# Patient Record
Sex: FEMALE | Race: BLACK OR AFRICAN AMERICAN | NOT HISPANIC OR LATINO | URBAN - METROPOLITAN AREA
[De-identification: names, ages, dates, MRNs, and addresses within clinical notes are randomized per-mention and may not be internally consistent; named-entity substitution may affect disease eponyms.]

---

## 2023-03-16 ENCOUNTER — TELEPHONE (OUTPATIENT)
Dept: PRIMARY CARE | Facility: CLINIC | Age: 33
End: 2023-03-16
Payer: MEDICAID

## 2023-03-16 DIAGNOSIS — Z01.84 IMMUNITY STATUS TESTING: Primary | ICD-10-CM

## 2023-03-16 NOTE — TELEPHONE ENCOUNTER
Patient called to ask for measles vaccination. She's not able to access it online. Would like to confirm if and when she received this. If not, then can she arrive tomorrow for one? Also, she's inuring about a Covid exemption paperwork and questions on it. One in particular asks when PCP discovered that she couldn't take vaccines for coronavirus?

## 2023-03-16 NOTE — TELEPHONE ENCOUNTER
Patient was called back she stated last year when she had blood work done the measles was not checked or resulted and she needs to have that order put in for her job.    In regards to the paperwork she was asking for too many changes and stated she was just going to schedule a visit to go over with pcp     Just need to add blood work order at this time

## 2023-03-17 ENCOUNTER — LAB (OUTPATIENT)
Dept: LAB | Facility: LAB | Age: 33
End: 2023-03-17
Payer: COMMERCIAL

## 2023-03-17 DIAGNOSIS — Z01.84 IMMUNITY STATUS TESTING: ICD-10-CM

## 2023-03-17 LAB — RUBEOLA IGG ANTIBODY: POSITIVE

## 2023-03-17 PROCEDURE — 36415 COLL VENOUS BLD VENIPUNCTURE: CPT

## 2023-03-17 PROCEDURE — 86765 RUBEOLA ANTIBODY: CPT

## 2023-03-22 PROBLEM — F41.0 GENERALIZED ANXIETY DISORDER WITH PANIC ATTACKS: Status: ACTIVE | Noted: 2023-03-22

## 2023-03-22 PROBLEM — F41.1 GENERALIZED ANXIETY DISORDER WITH PANIC ATTACKS: Status: ACTIVE | Noted: 2023-03-22

## 2023-03-22 PROBLEM — R29.898 HEAVY SENSATION OF LOWER EXTREMITY: Status: ACTIVE | Noted: 2023-03-22

## 2023-03-22 PROBLEM — J18.9 PNEUMONIA, PRIMARY ATYPICAL: Status: ACTIVE | Noted: 2023-03-22

## 2023-03-22 PROBLEM — J45.40 MODERATE PERSISTENT ASTHMA WITHOUT COMPLICATION (HHS-HCC): Status: ACTIVE | Noted: 2023-03-22

## 2023-03-22 PROBLEM — F51.04 CHRONIC INSOMNIA: Status: ACTIVE | Noted: 2023-03-22

## 2023-03-22 PROBLEM — M54.9 MULTILEVEL SPINE PAIN: Status: ACTIVE | Noted: 2023-03-22

## 2023-03-22 PROBLEM — B37.0 ORAL THRUSH: Status: ACTIVE | Noted: 2023-03-22

## 2023-03-22 PROBLEM — M79.2 NEUROPATHIC PAIN OF LOWER EXTREMITY: Status: ACTIVE | Noted: 2023-03-22

## 2023-03-22 PROBLEM — R53.81 MALAISE AND FATIGUE: Status: ACTIVE | Noted: 2023-03-22

## 2023-03-22 PROBLEM — M79.18 MYOFASCIAL PAIN DYSFUNCTION SYNDROME: Status: ACTIVE | Noted: 2023-03-22

## 2023-03-22 PROBLEM — G47.30 SLEEP APNEA: Status: ACTIVE | Noted: 2023-03-22

## 2023-03-22 PROBLEM — G89.29 CHRONIC LOW BACK PAIN: Status: ACTIVE | Noted: 2023-03-22

## 2023-03-22 PROBLEM — M25.561 RIGHT KNEE PAIN: Status: ACTIVE | Noted: 2023-03-22

## 2023-03-22 PROBLEM — M25.461 EFFUSION OF RIGHT KNEE: Status: ACTIVE | Noted: 2023-03-22

## 2023-03-22 PROBLEM — M54.16 LUMBAR RADICULOPATHY: Status: ACTIVE | Noted: 2023-03-22

## 2023-03-22 PROBLEM — M79.89 SWELLING OF BOTH LOWER EXTREMITIES: Status: ACTIVE | Noted: 2023-03-22

## 2023-03-22 PROBLEM — R20.2 NUMBNESS AND TINGLING OF BOTH LEGS: Status: ACTIVE | Noted: 2023-03-22

## 2023-03-22 PROBLEM — R20.0 NUMBNESS AND TINGLING OF BOTH LEGS: Status: ACTIVE | Noted: 2023-03-22

## 2023-03-22 PROBLEM — R53.83 MALAISE AND FATIGUE: Status: ACTIVE | Noted: 2023-03-22

## 2023-03-22 PROBLEM — M54.50 CHRONIC LOW BACK PAIN: Status: ACTIVE | Noted: 2023-03-22

## 2023-03-22 RX ORDER — ALBUTEROL SULFATE 90 UG/1
1 AEROSOL, METERED RESPIRATORY (INHALATION) EVERY 4 HOURS PRN
COMMUNITY
Start: 2020-04-09 | End: 2023-06-21 | Stop reason: SDUPTHER

## 2023-03-22 RX ORDER — ONDANSETRON 4 MG/1
4 TABLET, ORALLY DISINTEGRATING ORAL EVERY 8 HOURS PRN
COMMUNITY
Start: 2022-09-12 | End: 2023-10-13 | Stop reason: SDUPTHER

## 2023-03-22 RX ORDER — FLUTICASONE PROPIONATE AND SALMETEROL 50; 250 UG/1; UG/1
1 POWDER RESPIRATORY (INHALATION) EVERY 12 HOURS
COMMUNITY

## 2023-03-22 RX ORDER — ALBUTEROL SULFATE 0.83 MG/ML
2.5 SOLUTION RESPIRATORY (INHALATION) EVERY 6 HOURS PRN
COMMUNITY
Start: 2021-12-30 | End: 2023-10-13 | Stop reason: SDUPTHER

## 2023-03-22 RX ORDER — GABAPENTIN 400 MG/1
1 CAPSULE ORAL 3 TIMES DAILY
COMMUNITY
Start: 2018-10-24

## 2023-03-22 RX ORDER — PEN NEEDLE, DIABETIC 31 GX5/16"
NEEDLE, DISPOSABLE MISCELLANEOUS
COMMUNITY
Start: 2021-12-30

## 2023-03-22 RX ORDER — IBUPROFEN 600 MG/1
600 TABLET ORAL EVERY 6 HOURS PRN
COMMUNITY
Start: 2023-01-31

## 2023-03-22 RX ORDER — NORGESTIMATE AND ETHINYL ESTRADIOL 0.25-0.035
1 KIT ORAL DAILY
COMMUNITY
Start: 2022-08-30

## 2023-03-22 RX ORDER — TIRZEPATIDE 5 MG/.5ML
5 INJECTION, SOLUTION SUBCUTANEOUS
COMMUNITY
Start: 2023-02-23 | End: 2023-03-31 | Stop reason: SDUPTHER

## 2023-03-22 RX ORDER — TIRZEPATIDE 2.5 MG/.5ML
2.5 INJECTION, SOLUTION SUBCUTANEOUS
COMMUNITY
Start: 2022-12-14 | End: 2023-03-31 | Stop reason: DRUGHIGH

## 2023-03-22 RX ORDER — MELOXICAM 15 MG/1
TABLET ORAL
COMMUNITY
Start: 2022-08-29

## 2023-03-22 RX ORDER — BUDESONIDE AND FORMOTEROL FUMARATE DIHYDRATE 160; 4.5 UG/1; UG/1
2 AEROSOL RESPIRATORY (INHALATION)
COMMUNITY
Start: 2020-07-08

## 2023-03-31 ENCOUNTER — OFFICE VISIT (OUTPATIENT)
Dept: PRIMARY CARE | Facility: CLINIC | Age: 33
End: 2023-03-31
Payer: MEDICAID

## 2023-03-31 VITALS
OXYGEN SATURATION: 98 % | HEIGHT: 61 IN | BODY MASS INDEX: 29.83 KG/M2 | WEIGHT: 158 LBS | DIASTOLIC BLOOD PRESSURE: 75 MMHG | HEART RATE: 89 BPM | SYSTOLIC BLOOD PRESSURE: 107 MMHG

## 2023-03-31 DIAGNOSIS — E66.09 CLASS 1 OBESITY DUE TO EXCESS CALORIES WITHOUT SERIOUS COMORBIDITY IN ADULT, UNSPECIFIED BMI: ICD-10-CM

## 2023-03-31 DIAGNOSIS — J06.9 VIRAL UPPER RESPIRATORY TRACT INFECTION: Primary | ICD-10-CM

## 2023-03-31 PROCEDURE — 99213 OFFICE O/P EST LOW 20 MIN: CPT | Performed by: FAMILY MEDICINE

## 2023-03-31 PROCEDURE — 1036F TOBACCO NON-USER: CPT | Performed by: FAMILY MEDICINE

## 2023-03-31 RX ORDER — TIRZEPATIDE 5 MG/.5ML
5 INJECTION, SOLUTION SUBCUTANEOUS
Qty: 2 ML | Refills: 2 | Status: SHIPPED | OUTPATIENT
Start: 2023-03-31 | End: 2023-04-29 | Stop reason: DRUGHIGH

## 2023-03-31 ASSESSMENT — ENCOUNTER SYMPTOMS
COUGH: 0
FEVER: 0
CHILLS: 0
SORE THROAT: 1
MYALGIAS: 1
BACK PAIN: 1
FATIGUE: 0

## 2023-03-31 NOTE — PROGRESS NOTES
"Subjective   Patient ID: Leah Gonzáles is a 32 y.o. female who presents for Sore Throat.  HPI  Leah initially presented for paperwork completion, but has been ill for last several weeks, with sore throat,  congestion.  Review of Systems   Constitutional:  Negative for chills, fatigue and fever.   HENT:  Positive for congestion and sore throat.    Respiratory:  Negative for cough.    Cardiovascular:  Negative for chest pain.   Musculoskeletal:  Positive for back pain and myalgias (Lower extremity).       Objective   Vitals:    03/31/23 0832   BP: 107/75   BP Location: Left arm   Pulse: 89   SpO2: 98%   Weight: 71.7 kg (158 lb)   Height: 1.549 m (5' 1\")      Physical Exam  Constitutional:       General: She is not in acute distress.     Appearance: Normal appearance.   HENT:      Head: Normocephalic and atraumatic.      Right Ear: Tympanic membrane normal.      Left Ear: Tympanic membrane normal.      Ears:      Comments: Large buildup of soft brown cerumen bilaterally but not obstructing     Mouth/Throat:      Pharynx: Oropharynx is clear. No pharyngeal swelling or oropharyngeal exudate.   Eyes:      Extraocular Movements: Extraocular movements intact.      Pupils: Pupils are equal, round, and reactive to light.   Cardiovascular:      Rate and Rhythm: Normal rate and regular rhythm.   Pulmonary:      Effort: Pulmonary effort is normal.      Breath sounds: Normal breath sounds.   Abdominal:      General: There is no distension.      Tenderness: There is no abdominal tenderness.   Musculoskeletal:      Cervical back: Normal range of motion and neck supple.   Neurological:      General: No focal deficit present.      Mental Status: She is alert and oriented to person, place, and time.   Psychiatric:         Mood and Affect: Mood normal.         Behavior: Behavior normal.         Assessment/Plan   There are no diagnoses linked to this encounter.    Problem List Items Addressed This Visit    None  Visit Diagnoses  "      Viral upper respiratory tract infection    -  Primary    Discussed rest, supportive measures.  If not improved in next 3-5 days would consider treatment for sinusitis.

## 2023-04-06 ENCOUNTER — TELEPHONE (OUTPATIENT)
Dept: PRIMARY CARE | Facility: CLINIC | Age: 33
End: 2023-04-06
Payer: MEDICAID

## 2023-04-06 DIAGNOSIS — M79.2 NEUROPATHIC PAIN OF LOWER EXTREMITY, UNSPECIFIED LATERALITY: Primary | ICD-10-CM

## 2023-04-06 NOTE — TELEPHONE ENCOUNTER
I just left a vm w/pt to please call back w/ the specific name of the medication she needs a refill for.

## 2023-04-06 NOTE — TELEPHONE ENCOUNTER
Patient left voicemail on provider refill line for:    Name of medication & dose:  Acetaminophen for her leg going numb again  Quantity & refills requested: as per last time   Amount of medication remaining:  the VM didn't state  If out of medication, for how long?      Last office visit:  3/31/23  Last labs (if applicable):    Future appointment?  N/a    Pharmacy verified.  Aaliyah in chart  Allergies updated.       The patient was informed the requested medication request will be processed within 3 business days unless they are contacted by a staff member to advise otherwise.

## 2023-04-06 NOTE — TELEPHONE ENCOUNTER
Please call patient and verify I tried to call and no answer can try again later she does not have that medication listed in her chart

## 2023-04-07 NOTE — TELEPHONE ENCOUNTER
Pt said it's Tylenol 3. She last got it in February and he only gives it to her as needed, not a monthly refill.

## 2023-04-14 RX ORDER — ACETAMINOPHEN AND CODEINE PHOSPHATE 300; 30 MG/1; MG/1
1 TABLET ORAL EVERY 6 HOURS PRN
Qty: 24 TABLET | Refills: 0 | Status: SHIPPED | OUTPATIENT
Start: 2023-04-14 | End: 2023-04-20 | Stop reason: WASHOUT

## 2023-04-14 NOTE — TELEPHONE ENCOUNTER
Pt stopped up here inquiring about the status of the Tylenol 3. I explained the assistant is no longer here and the one taking over is trying to catch up, and she understood.

## 2023-04-24 ENCOUNTER — TELEPHONE (OUTPATIENT)
Dept: PRIMARY CARE | Facility: CLINIC | Age: 33
End: 2023-04-24
Payer: MEDICAID

## 2023-04-24 DIAGNOSIS — E66.09 CLASS 1 OBESITY DUE TO EXCESS CALORIES WITHOUT SERIOUS COMORBIDITY IN ADULT, UNSPECIFIED BMI: Primary | ICD-10-CM

## 2023-04-24 NOTE — TELEPHONE ENCOUNTER
Pt left a vm that during her last appt she stated she did not want to increase her Mounjaro, but she would like you to increase it due to her gaining weight. Pt call back # [phone]

## 2023-04-29 RX ORDER — TIRZEPATIDE 7.5 MG/.5ML
7.5 INJECTION, SOLUTION SUBCUTANEOUS
Qty: 2 ML | Refills: 2 | Status: SHIPPED | OUTPATIENT
Start: 2023-04-29 | End: 2023-06-21 | Stop reason: SDUPTHER

## 2023-05-01 ENCOUNTER — TELEPHONE (OUTPATIENT)
Dept: PRIMARY CARE | Facility: CLINIC | Age: 33
End: 2023-05-01
Payer: MEDICAID

## 2023-05-01 NOTE — TELEPHONE ENCOUNTER
Type of form: Prior - Mounjaro    Received via: fax    Received from: alvaro    Form placed: in your box

## 2023-05-04 ENCOUNTER — TELEPHONE (OUTPATIENT)
Dept: PRIMARY CARE | Facility: CLINIC | Age: 33
End: 2023-05-04
Payer: MEDICAID

## 2023-05-04 NOTE — TELEPHONE ENCOUNTER
Type of form: Prior - Mounjaro    Received via: fax    Received from: Aaliyah    Form placed: in your box

## 2023-05-15 ENCOUNTER — TELEPHONE (OUTPATIENT)
Dept: PRIMARY CARE | Facility: CLINIC | Age: 33
End: 2023-05-15
Payer: MEDICAID

## 2023-05-15 DIAGNOSIS — F41.1 GENERALIZED ANXIETY DISORDER WITH PANIC ATTACKS: Primary | ICD-10-CM

## 2023-05-15 DIAGNOSIS — F41.0 GENERALIZED ANXIETY DISORDER WITH PANIC ATTACKS: Primary | ICD-10-CM

## 2023-05-15 RX ORDER — LORAZEPAM 0.5 MG/1
0.5 TABLET ORAL 2 TIMES DAILY PRN
Qty: 30 TABLET | Refills: 0 | Status: SHIPPED | OUTPATIENT
Start: 2023-05-15 | End: 2023-06-30 | Stop reason: SDUPTHER

## 2023-05-15 NOTE — TELEPHONE ENCOUNTER
Pt walked into the office requesting medication for her anxiety. She said she has been calling. I said the messages were for weight loss. She wants medication for anxiety from 2019 ASAP.

## 2023-05-31 ENCOUNTER — TELEPHONE (OUTPATIENT)
Dept: PRIMARY CARE | Facility: CLINIC | Age: 33
End: 2023-05-31
Payer: MEDICAID

## 2023-05-31 NOTE — LETTER
June 1, 2023     Patient: Leah Gonzáles   YOB: 1990   Date of Visit: 5/31/2023       To Whom It May Concern:    Leah Gonzáles is under my care.  She suffers from claustrophobia.  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    If you have any questions or concerns, please don't hesitate to call.         Sincerely,         Nik Stanton MD        CC: No Recipients

## 2023-05-31 NOTE — TELEPHONE ENCOUNTER
Pt said she would need a letter stating she needs to move to a smaller classroom to a bigger classroom due to her claustrophobia  please.

## 2023-06-05 ENCOUNTER — TELEPHONE (OUTPATIENT)
Dept: PRIMARY CARE | Facility: CLINIC | Age: 33
End: 2023-06-05
Payer: MEDICAID

## 2023-06-05 NOTE — TELEPHONE ENCOUNTER
Received Medical Records Request from Santiago Ramon and sent to O. Received fax confirmation 6/1/23 @ 4:14pm

## 2023-06-21 ENCOUNTER — OFFICE VISIT (OUTPATIENT)
Dept: PRIMARY CARE | Facility: CLINIC | Age: 33
End: 2023-06-21
Payer: MEDICAID

## 2023-06-21 VITALS
SYSTOLIC BLOOD PRESSURE: 107 MMHG | HEART RATE: 94 BPM | BODY MASS INDEX: 30.99 KG/M2 | DIASTOLIC BLOOD PRESSURE: 74 MMHG | WEIGHT: 164 LBS

## 2023-06-21 DIAGNOSIS — H69.91 ETD (EUSTACHIAN TUBE DYSFUNCTION), RIGHT: ICD-10-CM

## 2023-06-21 DIAGNOSIS — M54.16 LUMBAR RADICULOPATHY: ICD-10-CM

## 2023-06-21 DIAGNOSIS — J45.40 MODERATE PERSISTENT ASTHMA WITHOUT COMPLICATION (HHS-HCC): ICD-10-CM

## 2023-06-21 DIAGNOSIS — E66.09 CLASS 1 OBESITY DUE TO EXCESS CALORIES WITHOUT SERIOUS COMORBIDITY WITH BODY MASS INDEX (BMI) OF 30.0 TO 30.9 IN ADULT: Primary | ICD-10-CM

## 2023-06-21 DIAGNOSIS — M79.2 NEUROPATHIC PAIN OF LOWER EXTREMITY, UNSPECIFIED LATERALITY: ICD-10-CM

## 2023-06-21 PROCEDURE — 3008F BODY MASS INDEX DOCD: CPT | Performed by: FAMILY MEDICINE

## 2023-06-21 PROCEDURE — 1036F TOBACCO NON-USER: CPT | Performed by: FAMILY MEDICINE

## 2023-06-21 PROCEDURE — 99214 OFFICE O/P EST MOD 30 MIN: CPT | Performed by: FAMILY MEDICINE

## 2023-06-21 RX ORDER — TIRZEPATIDE 7.5 MG/.5ML
7.5 INJECTION, SOLUTION SUBCUTANEOUS
Qty: 2 ML | Refills: 2 | Status: SHIPPED | OUTPATIENT
Start: 2023-06-21 | End: 2023-07-03

## 2023-06-21 RX ORDER — DIAZEPAM 5 MG/1
TABLET ORAL
COMMUNITY
Start: 2023-04-25

## 2023-06-21 RX ORDER — ALBUTEROL SULFATE 90 UG/1
1 AEROSOL, METERED RESPIRATORY (INHALATION) EVERY 4 HOURS PRN
Qty: 18 G | Refills: 1 | Status: SHIPPED | OUTPATIENT
Start: 2023-06-21 | End: 2023-10-13 | Stop reason: SDUPTHER

## 2023-06-21 RX ORDER — FLUTICASONE PROPIONATE 50 MCG
1 SPRAY, SUSPENSION (ML) NASAL DAILY
Qty: 16 G | Refills: 1 | Status: SHIPPED | OUTPATIENT
Start: 2023-06-21 | End: 2024-06-20

## 2023-06-21 RX ORDER — ACETAMINOPHEN AND CODEINE PHOSPHATE 300; 30 MG/1; MG/1
1 TABLET ORAL EVERY 6 HOURS PRN
Qty: 24 TABLET | Refills: 0 | Status: SHIPPED | OUTPATIENT
Start: 2023-06-21 | End: 2023-10-13 | Stop reason: SDUPTHER

## 2023-06-21 ASSESSMENT — ENCOUNTER SYMPTOMS
SLEEP DISTURBANCE: 1
FATIGUE: 1
ABDOMINAL PAIN: 0
SHORTNESS OF BREATH: 1
SINUS PRESSURE: 1
BACK PAIN: 1
FEVER: 0
NERVOUS/ANXIOUS: 1

## 2023-06-21 ASSESSMENT — PATIENT HEALTH QUESTIONNAIRE - PHQ9
SUM OF ALL RESPONSES TO PHQ9 QUESTIONS 1 AND 2: 0
1. LITTLE INTEREST OR PLEASURE IN DOING THINGS: NOT AT ALL
2. FEELING DOWN, DEPRESSED OR HOPELESS: NOT AT ALL

## 2023-06-21 NOTE — PROGRESS NOTES
Subjective   Patient ID: Leah Gonzáles is a 33 y.o. female who presents for Earache (right), Nasal Congestion, Allergies, and Sciatica.  Earache   Pertinent negatives include no abdominal pain.   Neuropathy        Leah presents for follow up of her chronic issues.    1) HEENT:  Having right sided earache, pain when swallowing.  Not getting same relief from her allergy pill (Claritin).  Has dry facial skin.  Has had some issues with her SOB.    2) Lumbar Radiculopathy:  Has ups and downs.      3) Anxiety:  Has occasional attacks.    4) Obesity:  Has plateaued at 5 mg Mounjaro, trying to get authorization for 7.5  Review of Systems   Constitutional:  Positive for fatigue. Negative for fever.   HENT:  Positive for congestion, ear pain and sinus pressure.    Respiratory:  Positive for shortness of breath.    Cardiovascular:  Negative for chest pain.   Gastrointestinal:  Negative for abdominal pain.   Musculoskeletal:  Positive for back pain.   Psychiatric/Behavioral:  Positive for sleep disturbance. The patient is nervous/anxious.        Objective   Vitals:    06/21/23 1527   BP: 107/74   BP Location: Left arm   Patient Position: Sitting   BP Cuff Size: Adult   Pulse: 94   Weight: 74.4 kg (164 lb)      Physical Exam  Constitutional:       Appearance: Normal appearance.   HENT:      Head: Normocephalic and atraumatic.      Right Ear: Tympanic membrane normal.      Left Ear: Tympanic membrane normal.      Ears:      Comments: Has moderate amount of cerumen to right EAC  Eyes:      Extraocular Movements: Extraocular movements intact.      Pupils: Pupils are equal, round, and reactive to light.   Cardiovascular:      Rate and Rhythm: Normal rate and regular rhythm.   Pulmonary:      Effort: Pulmonary effort is normal.      Breath sounds: Normal breath sounds.   Abdominal:      General: There is no distension.      Tenderness: There is no abdominal tenderness.   Musculoskeletal:      Cervical back: Normal range of  motion and neck supple.   Neurological:      General: No focal deficit present.      Mental Status: She is alert and oriented to person, place, and time.   Psychiatric:         Mood and Affect: Mood normal.         Behavior: Behavior normal.         Assessment/Plan   There are no diagnoses linked to this encounter.    Problem List Items Addressed This Visit       Lumbar radiculopathy    Relevant Medications    acetaminophen-codeine (Tylenol w/ Codeine #3) 300-30 mg tablet    Moderate persistent asthma without complication     Exacerbated by allergens.  Continue current treatment         Relevant Medications    albuterol 90 mcg/actuation inhaler    Neuropathic pain of lower extremity     Continue Tylenol 3 for severe pain         Relevant Medications    acetaminophen-codeine (Tylenol w/ Codeine #3) 300-30 mg tablet    Class 1 obesity due to excess calories without serious comorbidity with body mass index (BMI) of 30.0 to 30.9 in adult - Primary     Will get PA for Mounjaro 7.5 if needed, has done wonderfully well with this medication         Relevant Medications    tirzepatide (Mounjaro) 7.5 mg/0.5 mL pen injector     Other Visit Diagnoses       ETD (Eustachian tube dysfunction), right        Will trial nasal steroid, should also help with allergy    Relevant Medications    fluticasone (Flonase) 50 mcg/actuation nasal spray

## 2023-06-22 ENCOUNTER — TELEPHONE (OUTPATIENT)
Dept: PRIMARY CARE | Facility: CLINIC | Age: 33
End: 2023-06-22

## 2023-06-22 DIAGNOSIS — E66.09 CLASS 1 OBESITY DUE TO EXCESS CALORIES WITHOUT SERIOUS COMORBIDITY WITH BODY MASS INDEX (BMI) OF 30.0 TO 30.9 IN ADULT: Primary | ICD-10-CM

## 2023-06-22 NOTE — LETTER
July 6, 2023     Patient: Leah Gonzáles   YOB: 1990   Date of Visit: 6/22/2023       To Whom It May Concern:    Leah Gonzáles is under my care.  She has multiple medical issues that I am addressing through medication and therapy.  She is to only work twenty hours per week from Monday through Friday.    If you have any questions or concerns, please don't hesitate to call.         Sincerely,         Nik Stanton MD        CC: No Recipients

## 2023-06-22 NOTE — TELEPHONE ENCOUNTER
Type of form: Prior Royce -Mounjaro    Received via: fax    Received from: alvaro    Form placed: in your box

## 2023-06-30 ENCOUNTER — TELEPHONE (OUTPATIENT)
Dept: PRIMARY CARE | Facility: CLINIC | Age: 33
End: 2023-06-30
Payer: MEDICAID

## 2023-06-30 DIAGNOSIS — J30.9 ALLERGIC RHINITIS, UNSPECIFIED SEASONALITY, UNSPECIFIED TRIGGER: Primary | ICD-10-CM

## 2023-06-30 DIAGNOSIS — F41.1 GENERALIZED ANXIETY DISORDER WITH PANIC ATTACKS: ICD-10-CM

## 2023-06-30 DIAGNOSIS — F41.0 GENERALIZED ANXIETY DISORDER WITH PANIC ATTACKS: ICD-10-CM

## 2023-06-30 RX ORDER — LORAZEPAM 0.5 MG/1
0.5 TABLET ORAL 2 TIMES DAILY PRN
Qty: 30 TABLET | Refills: 0 | Status: SHIPPED | OUTPATIENT
Start: 2023-06-30 | End: 2023-09-28

## 2023-06-30 RX ORDER — MONTELUKAST SODIUM 10 MG/1
10 TABLET ORAL NIGHTLY
Qty: 30 TABLET | Refills: 5 | Status: SHIPPED | OUTPATIENT
Start: 2023-06-30 | End: 2023-12-27

## 2023-06-30 NOTE — TELEPHONE ENCOUNTER
I called and left a vm about the 2 prescriptions being sent to the pharmacy and the allergy medication must be taken at night and to call office with any questions.

## 2023-06-30 NOTE — TELEPHONE ENCOUNTER
Patient left voicemail on provider refill line for:    Name of medication & dose:  Lorazepam 0.5mg  Quantity & refills requested: as per last time  Amount of medication remaining:  VM didn't state  If out of medication, for how long?      Last office visit:  6/21/23  Last labs (if applicable):    Future appointment?  N/a    Pharmacy verified.  [facility] on file  Allergies updated.       The patient was informed the requested medication request will be processed within 3 business days unless they are contacted by a staff member to advise otherwise.

## 2023-06-30 NOTE — TELEPHONE ENCOUNTER
Pt came into the office saying she doesn't think that the flonaze that was prescribed is working and still have a lot on congestion and her throat her hurting. Pt says she doesn't know if she needs allergy medication or something stronger.

## 2023-06-30 NOTE — TELEPHONE ENCOUNTER
I will send a different allergy medication she will take at night to her pharmacy now, will also refill her Lorazepam  Nik Stanton MD

## 2023-07-03 RX ORDER — TIRZEPATIDE 5 MG/.5ML
5 INJECTION, SOLUTION SUBCUTANEOUS
Qty: 2 ML | Refills: 2 | Status: SHIPPED | OUTPATIENT
Start: 2023-07-03 | End: 2023-10-13 | Stop reason: SDUPTHER

## 2023-10-12 ENCOUNTER — TELEPHONE (OUTPATIENT)
Dept: PRIMARY CARE | Facility: CLINIC | Age: 33
End: 2023-10-12

## 2023-10-12 ENCOUNTER — OFFICE VISIT (OUTPATIENT)
Dept: PRIMARY CARE | Facility: CLINIC | Age: 33
End: 2023-10-12
Payer: MEDICAID

## 2023-10-12 VITALS
BODY MASS INDEX: 30.42 KG/M2 | WEIGHT: 161 LBS | OXYGEN SATURATION: 99 % | DIASTOLIC BLOOD PRESSURE: 75 MMHG | SYSTOLIC BLOOD PRESSURE: 110 MMHG | HEART RATE: 85 BPM

## 2023-10-12 DIAGNOSIS — J45.40 MODERATE PERSISTENT ASTHMA WITHOUT COMPLICATION (HHS-HCC): ICD-10-CM

## 2023-10-12 DIAGNOSIS — M79.2 NEUROPATHIC PAIN OF LOWER EXTREMITY, UNSPECIFIED LATERALITY: ICD-10-CM

## 2023-10-12 DIAGNOSIS — F41.0 GENERALIZED ANXIETY DISORDER WITH PANIC ATTACKS: ICD-10-CM

## 2023-10-12 DIAGNOSIS — F41.1 GENERALIZED ANXIETY DISORDER WITH PANIC ATTACKS: ICD-10-CM

## 2023-10-12 DIAGNOSIS — E66.09 CLASS 1 OBESITY DUE TO EXCESS CALORIES WITHOUT SERIOUS COMORBIDITY WITH BODY MASS INDEX (BMI) OF 30.0 TO 30.9 IN ADULT: ICD-10-CM

## 2023-10-12 DIAGNOSIS — M54.16 LUMBAR RADICULOPATHY: ICD-10-CM

## 2023-10-12 DIAGNOSIS — F32.1 CURRENT MODERATE EPISODE OF MAJOR DEPRESSIVE DISORDER WITHOUT PRIOR EPISODE (MULTI): Primary | ICD-10-CM

## 2023-10-12 DIAGNOSIS — R11.2 NAUSEA AND VOMITING, UNSPECIFIED VOMITING TYPE: Primary | ICD-10-CM

## 2023-10-12 PROCEDURE — 3008F BODY MASS INDEX DOCD: CPT | Performed by: FAMILY MEDICINE

## 2023-10-12 PROCEDURE — 99214 OFFICE O/P EST MOD 30 MIN: CPT | Performed by: FAMILY MEDICINE

## 2023-10-12 PROCEDURE — 1036F TOBACCO NON-USER: CPT | Performed by: FAMILY MEDICINE

## 2023-10-12 RX ORDER — DULOXETIN HYDROCHLORIDE 30 MG/1
30 CAPSULE, DELAYED RELEASE ORAL DAILY
Qty: 30 CAPSULE | Refills: 1 | Status: SHIPPED | OUTPATIENT
Start: 2023-10-12 | End: 2023-11-13 | Stop reason: SDUPTHER

## 2023-10-12 ASSESSMENT — PATIENT HEALTH QUESTIONNAIRE - PHQ9
3. TROUBLE FALLING OR STAYING ASLEEP OR SLEEPING TOO MUCH: NEARLY EVERY DAY
1. LITTLE INTEREST OR PLEASURE IN DOING THINGS: NEARLY EVERY DAY
7. TROUBLE CONCENTRATING ON THINGS, SUCH AS READING THE NEWSPAPER OR WATCHING TELEVISION: NEARLY EVERY DAY
10. IF YOU CHECKED OFF ANY PROBLEMS, HOW DIFFICULT HAVE THESE PROBLEMS MADE IT FOR YOU TO DO YOUR WORK, TAKE CARE OF THINGS AT HOME, OR GET ALONG WITH OTHER PEOPLE: EXTREMELY DIFFICULT
5. POOR APPETITE OR OVEREATING: NEARLY EVERY DAY
8. MOVING OR SPEAKING SO SLOWLY THAT OTHER PEOPLE COULD HAVE NOTICED. OR THE OPPOSITE, BEING SO FIGETY OR RESTLESS THAT YOU HAVE BEEN MOVING AROUND A LOT MORE THAN USUAL: NEARLY EVERY DAY
2. FEELING DOWN, DEPRESSED OR HOPELESS: NEARLY EVERY DAY
4. FEELING TIRED OR HAVING LITTLE ENERGY: NEARLY EVERY DAY
9. THOUGHTS THAT YOU WOULD BE BETTER OFF DEAD, OR OF HURTING YOURSELF: NOT AT ALL
6. FEELING BAD ABOUT YOURSELF - OR THAT YOU ARE A FAILURE OR HAVE LET YOURSELF OR YOUR FAMILY DOWN: NEARLY EVERY DAY
SUM OF ALL RESPONSES TO PHQ9 QUESTIONS 1 AND 2: 6
SUM OF ALL RESPONSES TO PHQ QUESTIONS 1-9: 24

## 2023-10-12 ASSESSMENT — ENCOUNTER SYMPTOMS
BACK PAIN: 1
DYSPHORIC MOOD: 1
NERVOUS/ANXIOUS: 1
ABDOMINAL PAIN: 1
MYALGIAS: 1
UNEXPECTED WEIGHT CHANGE: 0
DEPRESSION: 1
SHORTNESS OF BREATH: 1

## 2023-10-12 NOTE — ASSESSMENT & PLAN NOTE
I feel patient excellent candidate for Cymbalta.  I think it would help mood but also provide some chronic pain relief.  May not note relief for 2 weeks.  May see adverse effects right away.  Follow up in 1 month

## 2023-10-12 NOTE — TELEPHONE ENCOUNTER
Pt was seen in office today and just called inquiring about the following medication refills that she requested during her visit.     acetaminophen-codeine   albuterol nebulizer solution  albuterol inhaler   ondansetron   Yin

## 2023-10-12 NOTE — PROGRESS NOTES
Subjective   Patient ID: Leah Gonzáles is a 33 y.o. female who presents for Anxiety and Depression.  Anxiety  Symptoms include nervous/anxious behavior and shortness of breath. Patient reports no chest pain or suicidal ideas.       DepressionPatient presents with the following symptoms: nervousness/anxiety and shortness of breath.  Patient is not experiencing: suicidal ideas.        Leah presents for exacerbation of anxiety, associated with depression.  She recently graduated from nursing school, which should have been very happy for her, but she still struggles with depressed mood.  She also deals with chronic pain on daily basis.  Review of Systems   Constitutional:  Negative for unexpected weight change.   Respiratory:  Positive for shortness of breath.    Cardiovascular:  Negative for chest pain.   Gastrointestinal:  Positive for abdominal pain.   Musculoskeletal:  Positive for back pain and myalgias.   Psychiatric/Behavioral:  Positive for depression and dysphoric mood. Negative for suicidal ideas. The patient is nervous/anxious.        Objective   Vitals:    10/12/23 1358   BP: 110/75   Pulse: 85   SpO2: 99%   Weight: 73 kg (161 lb)      Physical Exam  Constitutional:       Appearance: Normal appearance.   HENT:      Head: Normocephalic and atraumatic.   Eyes:      Extraocular Movements: Extraocular movements intact.      Pupils: Pupils are equal, round, and reactive to light.   Cardiovascular:      Rate and Rhythm: Normal rate and regular rhythm.   Pulmonary:      Effort: Pulmonary effort is normal.      Breath sounds: Normal breath sounds.   Abdominal:      General: There is no distension.      Tenderness: There is no abdominal tenderness.   Musculoskeletal:      Cervical back: Normal range of motion and neck supple.   Neurological:      General: No focal deficit present.      Mental Status: She is alert and oriented to person, place, and time.   Psychiatric:         Mood and Affect: Mood normal.          Behavior: Behavior normal.         Assessment/Plan   There are no diagnoses linked to this encounter.    Problem List Items Addressed This Visit             ICD-10-CM    Generalized anxiety disorder with panic attacks F41.1, F41.0    Relevant Medications    DULoxetine (Cymbalta) 30 mg DR capsule    Other Relevant Orders    Follow Up In Primary Care - Established    Neuropathic pain of lower extremity M79.2    Relevant Medications    DULoxetine (Cymbalta) 30 mg DR capsule    Other Relevant Orders    Follow Up In Primary Care - Established    Current moderate episode of major depressive disorder without prior episode (CMS/[facility]) - Primary F32.1     I feel patient excellent candidate for Cymbalta.  I think it would help mood but also provide some chronic pain relief.  May not note relief for 2 weeks.  May see adverse effects right away.  Follow up in 1 month         Relevant Medications    DULoxetine (Cymbalta) 30 mg DR capsule    Other Relevant Orders    Follow Up In Primary Care - Established

## 2023-10-13 RX ORDER — TIRZEPATIDE 5 MG/.5ML
5 INJECTION, SOLUTION SUBCUTANEOUS
Qty: 2 ML | Refills: 2 | Status: SHIPPED | OUTPATIENT
Start: 2023-10-13 | End: 2024-01-05 | Stop reason: SDUPTHER

## 2023-10-13 RX ORDER — ALBUTEROL SULFATE 0.83 MG/ML
2.5 SOLUTION RESPIRATORY (INHALATION) EVERY 6 HOURS PRN
Qty: 75 ML | Refills: 2 | Status: SHIPPED | OUTPATIENT
Start: 2023-10-13 | End: 2024-03-15 | Stop reason: SDUPTHER

## 2023-10-13 RX ORDER — ALBUTEROL SULFATE 90 UG/1
1 AEROSOL, METERED RESPIRATORY (INHALATION) EVERY 4 HOURS PRN
Qty: 18 G | Refills: 1 | Status: SHIPPED | OUTPATIENT
Start: 2023-10-13 | End: 2024-03-15 | Stop reason: SDUPTHER

## 2023-10-13 RX ORDER — ACETAMINOPHEN AND CODEINE PHOSPHATE 300; 30 MG/1; MG/1
1 TABLET ORAL EVERY 6 HOURS PRN
Qty: 24 TABLET | Refills: 0 | Status: SHIPPED | OUTPATIENT
Start: 2023-10-13 | End: 2023-11-20 | Stop reason: SDUPTHER

## 2023-10-13 RX ORDER — ONDANSETRON 4 MG/1
4 TABLET, ORALLY DISINTEGRATING ORAL EVERY 8 HOURS PRN
Qty: 20 TABLET | Refills: 0 | Status: SHIPPED | OUTPATIENT
Start: 2023-10-13

## 2023-11-10 ENCOUNTER — TREATMENT (OUTPATIENT)
Dept: PHYSICAL THERAPY | Facility: CLINIC | Age: 33
End: 2023-11-10
Payer: MEDICAID

## 2023-11-10 DIAGNOSIS — M54.16 LUMBAR RADICULOPATHY: Primary | ICD-10-CM

## 2023-11-10 PROCEDURE — 97110 THERAPEUTIC EXERCISES: CPT | Mod: GP | Performed by: PHYSICAL THERAPIST

## 2023-11-10 NOTE — PROGRESS NOTES
Physical Therapy Follow-up    Patient Name: Leah Gonzáles  MRN: [id number]  Today's Date: 11/10/2023       Visit#: 3. 64 approved by 10/20/23.      Plan    Incr core stabilization exer.    Progress with POC, as tolerated.      Assessment    God stabilization with therex. Demo's HEP correctly.         Subjective    Patient reports:.   LBP and LE sxs ~ same. All function ~ same. HEP going well.    **Not here recently sec to having limited availability on my schedule.        Objective    Ortho     AROM LS flex: fingers to mid tib- Right LBP. Ext: wnl- LBP. SB: wnl bilat- pain with right SB.  SLR right: ~60 deg- post thigh pain. Incr pain with DF. TA activation: fair.    Outcome Measures   Modified Oswestry Low Back Pain Disability Index score: 42% at eval      Treatment      Therapeutic exercise (14106): units 3 .   Self right low LS rot mob in hooklying  Hooklying trunk rot  Bent knee fall-outs.  Hooklying heel taps with TA hold.  Hooklying piriformis stretch  2-leg bridge x 10- HEP 2x/day.  Sidelying leg lifts- HEP 20x, 2x/day.

## 2023-11-13 ENCOUNTER — OFFICE VISIT (OUTPATIENT)
Dept: PRIMARY CARE | Facility: CLINIC | Age: 33
End: 2023-11-13
Payer: MEDICAID

## 2023-11-13 VITALS
DIASTOLIC BLOOD PRESSURE: 65 MMHG | WEIGHT: 153 LBS | SYSTOLIC BLOOD PRESSURE: 100 MMHG | HEART RATE: 85 BPM | OXYGEN SATURATION: 99 % | BODY MASS INDEX: 28.91 KG/M2

## 2023-11-13 DIAGNOSIS — F41.1 GENERALIZED ANXIETY DISORDER WITH PANIC ATTACKS: ICD-10-CM

## 2023-11-13 DIAGNOSIS — G89.29 CHRONIC LOW BACK PAIN, UNSPECIFIED BACK PAIN LATERALITY, UNSPECIFIED WHETHER SCIATICA PRESENT: ICD-10-CM

## 2023-11-13 DIAGNOSIS — F32.1 CURRENT MODERATE EPISODE OF MAJOR DEPRESSIVE DISORDER WITHOUT PRIOR EPISODE (MULTI): Primary | ICD-10-CM

## 2023-11-13 DIAGNOSIS — M79.2 NEUROPATHIC PAIN OF LOWER EXTREMITY, UNSPECIFIED LATERALITY: ICD-10-CM

## 2023-11-13 DIAGNOSIS — F41.0 GENERALIZED ANXIETY DISORDER WITH PANIC ATTACKS: ICD-10-CM

## 2023-11-13 DIAGNOSIS — M54.50 CHRONIC LOW BACK PAIN, UNSPECIFIED BACK PAIN LATERALITY, UNSPECIFIED WHETHER SCIATICA PRESENT: ICD-10-CM

## 2023-11-13 PROCEDURE — 99214 OFFICE O/P EST MOD 30 MIN: CPT | Performed by: FAMILY MEDICINE

## 2023-11-13 PROCEDURE — 3008F BODY MASS INDEX DOCD: CPT | Performed by: FAMILY MEDICINE

## 2023-11-13 PROCEDURE — 1036F TOBACCO NON-USER: CPT | Performed by: FAMILY MEDICINE

## 2023-11-13 RX ORDER — DULOXETIN HYDROCHLORIDE 30 MG/1
30 CAPSULE, DELAYED RELEASE ORAL DAILY
Qty: 30 CAPSULE | Refills: 1 | Status: SHIPPED | OUTPATIENT
Start: 2023-11-13 | End: 2024-01-12

## 2023-11-13 ASSESSMENT — PATIENT HEALTH QUESTIONNAIRE - PHQ9
2. FEELING DOWN, DEPRESSED OR HOPELESS: SEVERAL DAYS
10. IF YOU CHECKED OFF ANY PROBLEMS, HOW DIFFICULT HAVE THESE PROBLEMS MADE IT FOR YOU TO DO YOUR WORK, TAKE CARE OF THINGS AT HOME, OR GET ALONG WITH OTHER PEOPLE: SOMEWHAT DIFFICULT
1. LITTLE INTEREST OR PLEASURE IN DOING THINGS: NOT AT ALL
SUM OF ALL RESPONSES TO PHQ9 QUESTIONS 1 AND 2: 1

## 2023-11-13 ASSESSMENT — ENCOUNTER SYMPTOMS
DEPRESSION: 1
SLEEP DISTURBANCE: 0
SHORTNESS OF BREATH: 0
UNEXPECTED WEIGHT CHANGE: 0
DYSPHORIC MOOD: 0
ABDOMINAL PAIN: 0
NERVOUS/ANXIOUS: 0
BACK PAIN: 1

## 2023-11-13 NOTE — PROGRESS NOTES
Subjective   Patient ID: Leah Gonzáles is a 33 y.o. female who presents for Depression (Medication follow up).  DepressionPatient is not experiencing: nervousness/anxiety and shortness of breath.        Leah presents for follow up of depression, as well as low back pain.  She does feel there has been positive response to Cymbalta.    She has been going to regular massage/stretch therapy which has been very helpful.  Review of Systems   Constitutional:  Negative for unexpected weight change (She has continued to lose weight with Mounjaro).   Respiratory:  Negative for shortness of breath.    Cardiovascular:  Negative for chest pain.   Gastrointestinal:  Negative for abdominal pain.   Musculoskeletal:  Positive for back pain.   Psychiatric/Behavioral:  Positive for depression. Negative for dysphoric mood and sleep disturbance. The patient is not nervous/anxious.        Objective   Vitals:    11/13/23 0841   BP: 100/65   Pulse: 85   SpO2: 99%   Weight: 69.4 kg (153 lb)      Physical Exam  Constitutional:       Appearance: Normal appearance.   HENT:      Head: Normocephalic and atraumatic.   Eyes:      Extraocular Movements: Extraocular movements intact.      Pupils: Pupils are equal, round, and reactive to light.   Cardiovascular:      Rate and Rhythm: Normal rate and regular rhythm.   Pulmonary:      Effort: Pulmonary effort is normal.      Breath sounds: Normal breath sounds.   Abdominal:      General: There is no distension.      Tenderness: There is no abdominal tenderness.   Musculoskeletal:      Cervical back: Normal range of motion and neck supple.   Neurological:      General: No focal deficit present.      Mental Status: She is alert and oriented to person, place, and time.   Psychiatric:         Mood and Affect: Mood normal.         Behavior: Behavior normal.         Assessment/Plan   Diagnoses and all orders for this visit:  Current moderate episode of major depressive disorder without prior episode  (CMS/[facility])  -     Follow Up In Primary Care - Established  Generalized anxiety disorder with panic attacks  -     Follow Up In Primary Care - Established  Neuropathic pain of lower extremity, unspecified laterality  -     Follow Up In Primary Care - Established      Problem List Items Addressed This Visit             ICD-10-CM    Chronic low back pain M54.50, G89.29     Having good response to stretching, will write referral for continued massage therapy         Relevant Orders    Referral to [facility]    Generalized anxiety disorder with panic attacks F41.1, F41.0    Relevant Medications    DULoxetine (Cymbalta) 30 mg DR capsule    Other Relevant Orders    Follow Up In Primary Care - Established    Neuropathic pain of lower extremity M79.2    Relevant Medications    DULoxetine (Cymbalta) 30 mg DR capsule    Other Relevant Orders    Referral to [facility]    Follow Up In Primary Care - Established    Current moderate episode of major depressive disorder without prior episode (CMS/[facility]) - Primary F32.1     Has had good response to Cymbalta.  Will continue, follow up in 2 months         Relevant Medications    DULoxetine (Cymbalta) 30 mg DR capsule    Other Relevant Orders    Follow Up In Primary Care - Established

## 2023-11-20 ENCOUNTER — TELEPHONE (OUTPATIENT)
Dept: PRIMARY CARE | Facility: CLINIC | Age: 33
End: 2023-11-20
Payer: MEDICAID

## 2023-11-20 DIAGNOSIS — M79.2 NEUROPATHIC PAIN OF LOWER EXTREMITY, UNSPECIFIED LATERALITY: ICD-10-CM

## 2023-11-20 DIAGNOSIS — M54.16 LUMBAR RADICULOPATHY: ICD-10-CM

## 2023-11-20 RX ORDER — ACETAMINOPHEN AND CODEINE PHOSPHATE 300; 30 MG/1; MG/1
1 TABLET ORAL EVERY 6 HOURS PRN
Qty: 24 TABLET | Refills: 0 | Status: SHIPPED | OUTPATIENT
Start: 2023-11-20 | End: 2024-01-05 | Stop reason: SDUPTHER

## 2023-11-20 NOTE — TELEPHONE ENCOUNTER
Name of medication & dose:  acetaminophen-codeine (Tylenol w/ Codeine #3) 300-30 mg tablet     Quantity & refills requested: n/a    Amount of medication remaining:  n/a    If out of medication, for how long?  n/a    Last office visit:  11/13/23    Last labs (if applicable):      Future appointment?      Pharmacy verified.  on file    Allergies updated.       The patient was informed the requested medication request will be processed within 3 business days unless they are contacted by a staff member to advise otherwise.

## 2023-11-30 ENCOUNTER — APPOINTMENT (OUTPATIENT)
Dept: PRIMARY CARE | Facility: CLINIC | Age: 33
End: 2023-11-30
Payer: MEDICAID

## 2023-12-01 ENCOUNTER — APPOINTMENT (OUTPATIENT)
Dept: PHYSICAL THERAPY | Facility: CLINIC | Age: 33
End: 2023-12-01
Payer: MEDICAID

## 2023-12-14 ENCOUNTER — TELEPHONE (OUTPATIENT)
Dept: PRIMARY CARE | Facility: CLINIC | Age: 33
End: 2023-12-14
Payer: MEDICAID

## 2023-12-14 DIAGNOSIS — Z11.1 SCREENING FOR TUBERCULOSIS: Primary | ICD-10-CM

## 2023-12-18 ENCOUNTER — TELEMEDICINE (OUTPATIENT)
Dept: PRIMARY CARE | Facility: CLINIC | Age: 33
End: 2023-12-18
Payer: MEDICAID

## 2023-12-18 DIAGNOSIS — B34.9 ACUTE VIRAL SYNDROME: Primary | ICD-10-CM

## 2023-12-18 PROCEDURE — 99213 OFFICE O/P EST LOW 20 MIN: CPT | Performed by: FAMILY MEDICINE

## 2023-12-18 ASSESSMENT — PATIENT HEALTH QUESTIONNAIRE - PHQ9
SUM OF ALL RESPONSES TO PHQ9 QUESTIONS 1 AND 2: 2
10. IF YOU CHECKED OFF ANY PROBLEMS, HOW DIFFICULT HAVE THESE PROBLEMS MADE IT FOR YOU TO DO YOUR WORK, TAKE CARE OF THINGS AT HOME, OR GET ALONG WITH OTHER PEOPLE: SOMEWHAT DIFFICULT
2. FEELING DOWN, DEPRESSED OR HOPELESS: SEVERAL DAYS
1. LITTLE INTEREST OR PLEASURE IN DOING THINGS: SEVERAL DAYS

## 2023-12-18 ASSESSMENT — ENCOUNTER SYMPTOMS
ABDOMINAL PAIN: 0
MYALGIAS: 1
HOARSE VOICE: 1
ARTHRALGIAS: 1
HEADACHES: 0
TROUBLE SWALLOWING: 0
FEVER: 1
COUGH: 1
SHORTNESS OF BREATH: 0
DIARRHEA: 0
VOMITING: 0
SWOLLEN GLANDS: 0
STRIDOR: 0
NECK PAIN: 0
SORE THROAT: 1

## 2023-12-18 NOTE — PROGRESS NOTES
Subjective   Patient ID: Leah Gonzáles is a 33 y.o. female who presents for Cough (Congestion, sore throat, fever 2x).    Leah presents for symptoms that started within last 36 hours.  Cough  Associated symptoms include a fever, myalgias and a sore throat. Pertinent negatives include no ear pain, headaches or shortness of breath.   Sore Throat   This is a new problem. The current episode started yesterday. The problem has been gradually improving. Neither side of throat is experiencing more pain than the other. There has been no fever. The fever has been present for Less than 1 day. The pain is at a severity of 4/10. Associated symptoms include congestion, coughing and a hoarse voice. Pertinent negatives include no abdominal pain, diarrhea, drooling, ear discharge, ear pain, headaches, plugged ear sensation, neck pain, shortness of breath, stridor, swollen glands, trouble swallowing or vomiting. She has had no exposure to strep or mono.       Review of Systems   Constitutional:  Positive for fever.   HENT:  Positive for congestion, hoarse voice and sore throat. Negative for drooling, ear discharge, ear pain and trouble swallowing.    Respiratory:  Positive for cough. Negative for shortness of breath and stridor.    Gastrointestinal:  Negative for abdominal pain, diarrhea and vomiting.   Musculoskeletal:  Positive for arthralgias and myalgias. Negative for neck pain.   Neurological:  Negative for headaches.       Objective   There were no vitals filed for this visit.   Physical Exam  Constitutional:       General: She is not in acute distress.     Appearance: Normal appearance.   HENT:      Head: Normocephalic and atraumatic.      Right Ear: External ear normal.      Left Ear: External ear normal.      Nose: Nose normal.   Eyes:      Conjunctiva/sclera: Conjunctivae normal.   Pulmonary:      Effort: Pulmonary effort is normal. No respiratory distress.   Neurological:      Mental Status: She is alert and  oriented to person, place, and time.   Psychiatric:         Mood and Affect: Mood normal.         Behavior: Behavior normal.         Thought Content: Thought content normal.         Assessment/Plan   There are no diagnoses linked to this encounter.    Problem List Items Addressed This Visit    None  Visit Diagnoses         Codes    Acute viral syndrome    -  Primary B34.9    Symptoms consistent with influenza or COVID.  She does not work outside home, can monitor symptoms, treat supportively, notify me if not improved in next 3 days

## 2024-01-02 ENCOUNTER — LAB (OUTPATIENT)
Dept: LAB | Facility: LAB | Age: 34
End: 2024-01-02
Payer: MEDICAID

## 2024-01-02 DIAGNOSIS — Z11.1 SCREENING FOR TUBERCULOSIS: ICD-10-CM

## 2024-01-02 PROCEDURE — 86481 TB AG RESPONSE T-CELL SUSP: CPT

## 2024-01-02 PROCEDURE — 36415 COLL VENOUS BLD VENIPUNCTURE: CPT

## 2024-01-04 LAB
NIL(NEG) CONTROL SPOT COUNT: NORMAL
PANEL A SPOT COUNT: 0
PANEL B SPOT COUNT: 0
POS CONTROL SPOT COUNT: NORMAL
T-SPOT. TB INTERPRETATION: NEGATIVE

## 2024-01-05 ENCOUNTER — TELEPHONE (OUTPATIENT)
Dept: PRIMARY CARE | Facility: CLINIC | Age: 34
End: 2024-01-05
Payer: MEDICAID

## 2024-01-05 DIAGNOSIS — M54.16 LUMBAR RADICULOPATHY: ICD-10-CM

## 2024-01-05 DIAGNOSIS — E66.09 CLASS 1 OBESITY DUE TO EXCESS CALORIES WITHOUT SERIOUS COMORBIDITY WITH BODY MASS INDEX (BMI) OF 30.0 TO 30.9 IN ADULT: ICD-10-CM

## 2024-01-05 DIAGNOSIS — M79.2 NEUROPATHIC PAIN OF LOWER EXTREMITY, UNSPECIFIED LATERALITY: ICD-10-CM

## 2024-01-05 RX ORDER — TIRZEPATIDE 5 MG/.5ML
5 INJECTION, SOLUTION SUBCUTANEOUS
Qty: 2 ML | Refills: 2 | Status: SHIPPED | OUTPATIENT
Start: 2024-01-05

## 2024-01-05 RX ORDER — ACETAMINOPHEN AND CODEINE PHOSPHATE 300; 30 MG/1; MG/1
1 TABLET ORAL EVERY 6 HOURS PRN
Qty: 24 TABLET | Refills: 0 | Status: SHIPPED | OUTPATIENT
Start: 2024-01-05 | End: 2024-02-27 | Stop reason: SDUPTHER

## 2024-01-05 NOTE — TELEPHONE ENCOUNTER
Pt said she was to call to update you on how she's feeling after he sick visit about 2 weeks ago. Pt said she's feeling a little better, but it still dealing with a runny nose.

## 2024-01-05 NOTE — TELEPHONE ENCOUNTER
Patient left voicemail on provider refill line for:      Name of medication & dose:  acetaminophen-codeine and Mounjaro  Quantity & refills requested: as per last time  Amount of medication remaining:  a few days of the pills and needs another pen    If out of medication, for how long?      Last office visit:  12/18/23  Last labs (if applicable):    Future appointment?  N/a    Pharmacy verified.  Aaliyah on file  Allergies updated.       The patient was informed the requested medication request will be processed within 3 business days unless they are contacted by a staff member to advise otherwise.

## 2024-01-12 ENCOUNTER — TELEPHONE (OUTPATIENT)
Dept: PRIMARY CARE | Facility: CLINIC | Age: 34
End: 2024-01-12
Payer: MEDICAID

## 2024-01-12 NOTE — TELEPHONE ENCOUNTER
Type of form:  Disability forms for life insurance - renewal    Received via: pt dropped it off    Received from: Meriden Pavlok    When completed and signed: [phone]    Form placed: in rey's box

## 2024-01-23 DIAGNOSIS — M54.9 MULTILEVEL SPINE PAIN: ICD-10-CM

## 2024-01-23 DIAGNOSIS — R20.2 NUMBNESS AND TINGLING OF BOTH LEGS: ICD-10-CM

## 2024-01-23 DIAGNOSIS — R20.0 NUMBNESS AND TINGLING OF BOTH LEGS: ICD-10-CM

## 2024-01-23 DIAGNOSIS — G89.29 CHRONIC LOW BACK PAIN, UNSPECIFIED BACK PAIN LATERALITY, UNSPECIFIED WHETHER SCIATICA PRESENT: ICD-10-CM

## 2024-01-23 DIAGNOSIS — M79.2 NEUROPATHIC PAIN OF LOWER EXTREMITY, UNSPECIFIED LATERALITY: ICD-10-CM

## 2024-01-23 DIAGNOSIS — M79.18 MYOFASCIAL PAIN DYSFUNCTION SYNDROME: ICD-10-CM

## 2024-01-23 DIAGNOSIS — R29.898 HEAVY SENSATION OF LOWER EXTREMITY: ICD-10-CM

## 2024-01-23 DIAGNOSIS — M54.50 CHRONIC LOW BACK PAIN, UNSPECIFIED BACK PAIN LATERALITY, UNSPECIFIED WHETHER SCIATICA PRESENT: ICD-10-CM

## 2024-01-23 DIAGNOSIS — M54.16 LUMBAR RADICULOPATHY: ICD-10-CM

## 2024-02-05 ENCOUNTER — OFFICE VISIT (OUTPATIENT)
Dept: PRIMARY CARE | Facility: CLINIC | Age: 34
End: 2024-02-05
Payer: MEDICAID

## 2024-02-05 VITALS — SYSTOLIC BLOOD PRESSURE: 93 MMHG | OXYGEN SATURATION: 97 % | DIASTOLIC BLOOD PRESSURE: 53 MMHG | HEART RATE: 88 BPM

## 2024-02-05 DIAGNOSIS — L70.0 ACNE VULGARIS: ICD-10-CM

## 2024-02-05 DIAGNOSIS — H61.21 IMPACTED CERUMEN OF RIGHT EAR: Primary | ICD-10-CM

## 2024-02-05 PROCEDURE — 69209 REMOVE IMPACTED EAR WAX UNI: CPT | Performed by: FAMILY MEDICINE

## 2024-02-05 PROCEDURE — 3008F BODY MASS INDEX DOCD: CPT | Performed by: FAMILY MEDICINE

## 2024-02-05 PROCEDURE — 99213 OFFICE O/P EST LOW 20 MIN: CPT | Performed by: FAMILY MEDICINE

## 2024-02-05 PROCEDURE — 1036F TOBACCO NON-USER: CPT | Performed by: FAMILY MEDICINE

## 2024-02-05 RX ORDER — CLINDAMYCIN PHOSPHATE 11.9 MG/ML
SOLUTION TOPICAL 2 TIMES DAILY
Qty: 60 ML | Refills: 1 | Status: SHIPPED | OUTPATIENT
Start: 2024-02-05 | End: 2024-10-02

## 2024-02-05 ASSESSMENT — ENCOUNTER SYMPTOMS
FEVER: 0
SHORTNESS OF BREATH: 0

## 2024-02-05 ASSESSMENT — PATIENT HEALTH QUESTIONNAIRE - PHQ9
SUM OF ALL RESPONSES TO PHQ9 QUESTIONS 1 AND 2: 0
2. FEELING DOWN, DEPRESSED OR HOPELESS: NOT AT ALL
1. LITTLE INTEREST OR PLEASURE IN DOING THINGS: NOT AT ALL

## 2024-02-05 NOTE — PROGRESS NOTES
Subjective   Patient ID: Leah Gonzáles is a 33 y.o. female who presents for Cerumen Impaction.  HPI  Leah presents for chief concern of hearing loss felt secondary to cerumen impaction.      She also has some concern with acne on face.  Review of Systems   Constitutional:  Negative for fever.   HENT:  Positive for hearing loss.    Respiratory:  Negative for shortness of breath.    Cardiovascular:  Negative for chest pain.       Objective   Vitals:    02/05/24 1259   BP: 93/53   Pulse: 88   SpO2: 97%      Physical Exam  Constitutional:       Appearance: Normal appearance.   HENT:      Head: Normocephalic and atraumatic.      Ears:      Comments: After irrigation both TM's normal, had complete obstruction on right  Eyes:      Extraocular Movements: Extraocular movements intact.      Pupils: Pupils are equal, round, and reactive to light.   Cardiovascular:      Rate and Rhythm: Normal rate and regular rhythm.   Pulmonary:      Effort: Pulmonary effort is normal.      Breath sounds: Normal breath sounds.   Abdominal:      General: There is no distension.      Tenderness: There is no abdominal tenderness.   Musculoskeletal:      Cervical back: Normal range of motion and neck supple.   Skin:         Neurological:      General: No focal deficit present.      Mental Status: She is alert and oriented to person, place, and time.   Psychiatric:         Mood and Affect: Mood normal.         Behavior: Behavior normal.     Patient ID: Leah Gonzáles is a 33 y.o. female.    Ear Cerumen Removal    Date/Time: 2/5/2024 1:19 PM    Performed by: Nik Stanton MD  Authorized by: Nik Stanton MD    Consent:     Consent obtained:  Verbal    Risks, benefits, and alternatives were discussed: yes      Alternatives discussed:  No treatment  Procedure details:     Location:  R ear    Procedure type: irrigation      Procedure outcomes: cerumen removed    Post-procedure details:     Hearing quality:  Improved    Procedure  completion:  Tolerated well, no immediate complications      Assessment/Plan   There are no diagnoses linked to this encounter.    Problem List Items Addressed This Visit             ICD-10-CM    Acne vulgaris L70.0     Mild, will start with topical clindamycin         Relevant Medications    clindamycin (Cleocin T) 1 % external solution     Other Visit Diagnoses         Codes    Impacted cerumen of right ear    -  Primary H61.21    Removed easily with irrigation as detailed above.

## 2024-02-07 ENCOUNTER — EVALUATION (OUTPATIENT)
Dept: PHYSICAL THERAPY | Facility: CLINIC | Age: 34
End: 2024-02-07
Payer: MEDICAID

## 2024-02-07 DIAGNOSIS — M54.16 LUMBAR RADICULOPATHY: ICD-10-CM

## 2024-02-07 PROCEDURE — 97161 PT EVAL LOW COMPLEX 20 MIN: CPT | Mod: GP | Performed by: PHYSICAL THERAPIST

## 2024-02-07 PROCEDURE — 97110 THERAPEUTIC EXERCISES: CPT | Mod: GP | Performed by: PHYSICAL THERAPIST

## 2024-02-07 ASSESSMENT — PAIN - FUNCTIONAL ASSESSMENT: PAIN_FUNCTIONAL_ASSESSMENT: 0-10

## 2024-02-07 ASSESSMENT — PAIN SCALES - GENERAL: PAINLEVEL_OUTOF10: 5 - MODERATE PAIN

## 2024-02-07 NOTE — PROGRESS NOTES
Physical Therapy Evaluation    Patient Name: Leah Gonzáles  MRN: [id number]  Today's Date: 2/7/2024       Visit #: 1  Visits approved: Auth required.  Certification dates: NA    Precautions:  Precautions  Precautions Comment: None. Low fall risk.    Subjective/History    Other Measures  Oswestry Disablity Index (RADHA): 38%    DOI: 8/1/22.  Mechanism of injury: insidious.  Area of symptoms: Intermittent tight ache at low back and right post thigh, leg and plantar foot. Pain Assessment: 0-10  Pain Score: 5 - Moderate pain  Aggravating factors: Walk 20 min. In/out of car. Up/down stairs- intermittent.   Easing factors: Tylenol, Naproxen.   Red flags: none.  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   Recreation/Hobbies/Sports: Sedentary.  Living situation: Unremarkable.   Barriers to treatment: None.   Preferred language: English.    Objective Findings  Observation/gait: Unremarkable.  AROM LS flex: wnl- pulling pain at post right thigh. Ext: wnl- LBP. SB: wnl bilat.   Muscle activation/Resisted testing: LE motor all wnl.  Special tests: SLR Right ~60- post thigh pain, incr pain with DF. Left ~70- HS stretch, no change with DF.  Palpation: Soft tissue wnl.  IV- Ls PA: Local pain and jt stiffness RA L3-5.    Treatment:   Therex: Self right low LS rot mob in hooklying// Flex: wnl- decr sxs- HEP 1 min, 2x/day as helpful.  Pelvic tilts- HEP 1 min, 2x/day.  Bent knee fall-outs- HEP 1 min, 2x/day.    Assessment  Patient presents with positive SLR, local pain and jt stiffness consistent with L3-5 dysfunction with radic.    Plan  Physical therapy 1-2 times/week for 4-6 wks. Therapy may include the following: Therapeutic exercise, manual therapy, education/home program.     Goals: Walk 30 min for exer w/o pain. Up and down 2 flights of stairs for household ambulation w/o pain.  In/out of car w/o pain.

## 2024-02-16 ENCOUNTER — APPOINTMENT (OUTPATIENT)
Dept: PHYSICAL THERAPY | Facility: CLINIC | Age: 34
End: 2024-02-16
Payer: MEDICAID

## 2024-02-23 ENCOUNTER — APPOINTMENT (OUTPATIENT)
Dept: PHYSICAL THERAPY | Facility: CLINIC | Age: 34
End: 2024-02-23
Payer: MEDICAID

## 2024-02-27 ENCOUNTER — TELEPHONE (OUTPATIENT)
Dept: PRIMARY CARE | Facility: CLINIC | Age: 34
End: 2024-02-27

## 2024-02-27 ENCOUNTER — OFFICE VISIT (OUTPATIENT)
Dept: PRIMARY CARE | Facility: CLINIC | Age: 34
End: 2024-02-27
Payer: MEDICAID

## 2024-02-27 VITALS
WEIGHT: 158.2 LBS | OXYGEN SATURATION: 96 % | SYSTOLIC BLOOD PRESSURE: 111 MMHG | DIASTOLIC BLOOD PRESSURE: 76 MMHG | HEART RATE: 83 BPM | BODY MASS INDEX: 29.89 KG/M2

## 2024-02-27 DIAGNOSIS — M54.16 LUMBAR RADICULOPATHY: ICD-10-CM

## 2024-02-27 DIAGNOSIS — L30.9 HAND DERMATITIS: Primary | ICD-10-CM

## 2024-02-27 DIAGNOSIS — M79.2 NEUROPATHIC PAIN OF LOWER EXTREMITY, UNSPECIFIED LATERALITY: ICD-10-CM

## 2024-02-27 PROCEDURE — 99213 OFFICE O/P EST LOW 20 MIN: CPT | Performed by: FAMILY MEDICINE

## 2024-02-27 PROCEDURE — 1036F TOBACCO NON-USER: CPT | Performed by: FAMILY MEDICINE

## 2024-02-27 PROCEDURE — 3008F BODY MASS INDEX DOCD: CPT | Performed by: FAMILY MEDICINE

## 2024-02-27 RX ORDER — ACETAMINOPHEN AND CODEINE PHOSPHATE 300; 30 MG/1; MG/1
1 TABLET ORAL EVERY 6 HOURS PRN
Qty: 24 TABLET | Refills: 0 | Status: SHIPPED | OUTPATIENT
Start: 2024-02-27 | End: 2024-03-04

## 2024-02-27 RX ORDER — CLOBETASOL PROPIONATE 0.5 MG/G
OINTMENT TOPICAL 2 TIMES DAILY
Qty: 15 G | Refills: 0 | Status: SHIPPED | OUTPATIENT
Start: 2024-02-27 | End: 2024-03-12

## 2024-02-27 ASSESSMENT — ENCOUNTER SYMPTOMS: SHORTNESS OF BREATH: 0

## 2024-02-27 NOTE — PROGRESS NOTES
Subjective   Patient ID: Leah Gonzáles is a 33 y.o. female who presents for Rash (On right hand).  Rash  Pertinent negatives include no shortness of breath.     Leah presents for evaluation of persistent rash/scale to right hand.  No relief with OTC.  Review of Systems   Respiratory:  Negative for shortness of breath.    Cardiovascular:  Negative for chest pain.   Skin:  Positive for rash.       Objective   Vitals:    02/27/24 0839   BP: 111/76   Pulse: 83   SpO2: 96%   Weight: 71.8 kg (158 lb 3.2 oz)      Physical Exam  Constitutional:       General: She is not in acute distress.     Appearance: Normal appearance.   HENT:      Head: Normocephalic and atraumatic.      Right Ear: External ear normal.      Left Ear: External ear normal.      Nose: Nose normal.   Eyes:      Conjunctiva/sclera: Conjunctivae normal.   Pulmonary:      Effort: Pulmonary effort is normal. No respiratory distress.   Skin:         Neurological:      Mental Status: She is alert and oriented to person, place, and time.   Psychiatric:         Mood and Affect: Mood normal.         Behavior: Behavior normal.         Thought Content: Thought content normal.         Assessment/Plan   There are no diagnoses linked to this encounter.    Problem List Items Addressed This Visit             ICD-10-CM    Lumbar radiculopathy M54.16    Relevant Medications    acetaminophen-codeine (Tylenol w/ Codeine #3) 300-30 mg tablet    Neuropathic pain of lower extremity M79.2    Relevant Medications    acetaminophen-codeine (Tylenol w/ Codeine #3) 300-30 mg tablet     Other Visit Diagnoses         Codes    Hand dermatitis    -  Primary L30.9    Relevant Medications    clobetasol (Temovate) 0.05 % ointment

## 2024-02-27 NOTE — TELEPHONE ENCOUNTER
Received medical records request from [email or web address]. Recevied fax confirmation from [facility] at 2/27/24 2 9:56am

## 2024-03-01 ENCOUNTER — TREATMENT (OUTPATIENT)
Dept: PHYSICAL THERAPY | Facility: CLINIC | Age: 34
End: 2024-03-01
Payer: MEDICAID

## 2024-03-01 DIAGNOSIS — M54.16 LUMBAR RADICULOPATHY: ICD-10-CM

## 2024-03-01 PROCEDURE — 97110 THERAPEUTIC EXERCISES: CPT | Mod: GP | Performed by: PHYSICAL THERAPIST

## 2024-03-01 PROCEDURE — 97140 MANUAL THERAPY 1/> REGIONS: CPT | Mod: GP | Performed by: PHYSICAL THERAPIST

## 2024-03-01 NOTE — PROGRESS NOTES
Physical Therapy Follow-up    Patient Name: Leah Gonzáles  MRN: [id number]  Today's Date: 3/1/2024       Visit#: 3. 18 approved by 5/3/24.      Plan    Incr core stabilization exer.    Progress with POC, as tolerated.      Assessment    God stabilization with therex. Demo's HEP correctly.         Subjective    Patient reports:.   LBP and LE sxs ~ same. All function ~ same. LS flare-ups less frequent. HEP going well.       Objective    Ortho     AROM LS flex:wnl. Ext: wnl- LBP. SB: wnl bilat- pain with right SB.    Outcome Measures   Modified Oswestry Low Back Pain Disability Index score: 42% at eval      Treatment         IV- PA, bilat transverse L3-5//SB: wnl- decr pain.      Therapeutic exercise (68966): units 3 .   Self right low LS rot mob in hooklying  Hooklying trunk rot.  Bent knee fall-outs.  Hooklying heel taps with TA hold.  Hooklying piriformis stretch  2-leg bridge x 10.  Sidelying leg lifts.  Alternating 1-leg bridge- HEP 10x, 2x/day.

## 2024-03-15 ENCOUNTER — OFFICE VISIT (OUTPATIENT)
Dept: PRIMARY CARE | Facility: CLINIC | Age: 34
End: 2024-03-15
Payer: MEDICAID

## 2024-03-15 ENCOUNTER — APPOINTMENT (OUTPATIENT)
Dept: PHYSICAL THERAPY | Facility: CLINIC | Age: 34
End: 2024-03-15
Payer: MEDICAID

## 2024-03-15 VITALS
HEART RATE: 75 BPM | BODY MASS INDEX: 30.08 KG/M2 | WEIGHT: 159.2 LBS | SYSTOLIC BLOOD PRESSURE: 92 MMHG | OXYGEN SATURATION: 98 % | DIASTOLIC BLOOD PRESSURE: 65 MMHG

## 2024-03-15 DIAGNOSIS — J45.40 MODERATE PERSISTENT ASTHMA WITHOUT COMPLICATION (HHS-HCC): ICD-10-CM

## 2024-03-15 DIAGNOSIS — R10.84 GENERALIZED ABDOMINAL PAIN: Primary | ICD-10-CM

## 2024-03-15 DIAGNOSIS — L85.3 XEROSIS CUTIS: ICD-10-CM

## 2024-03-15 PROCEDURE — 3008F BODY MASS INDEX DOCD: CPT | Performed by: FAMILY MEDICINE

## 2024-03-15 PROCEDURE — 99214 OFFICE O/P EST MOD 30 MIN: CPT | Performed by: FAMILY MEDICINE

## 2024-03-15 PROCEDURE — 1036F TOBACCO NON-USER: CPT | Performed by: FAMILY MEDICINE

## 2024-03-15 RX ORDER — ALBUTEROL SULFATE 0.83 MG/ML
2.5 SOLUTION RESPIRATORY (INHALATION) EVERY 6 HOURS PRN
Qty: 75 ML | Refills: 2 | Status: SHIPPED | OUTPATIENT
Start: 2024-03-15

## 2024-03-15 RX ORDER — AMMONIUM LACTATE 12 G/100G
CREAM TOPICAL AS NEEDED
Qty: 140 G | Refills: 1 | Status: SHIPPED | OUTPATIENT
Start: 2024-03-15 | End: 2025-03-15

## 2024-03-15 RX ORDER — ALBUTEROL SULFATE 90 UG/1
1 AEROSOL, METERED RESPIRATORY (INHALATION) EVERY 4 HOURS PRN
Qty: 18 G | Refills: 1 | Status: SHIPPED | OUTPATIENT
Start: 2024-03-15

## 2024-03-15 ASSESSMENT — ENCOUNTER SYMPTOMS
ARTHRALGIAS: 0
ANOREXIA: 0
MYALGIAS: 0
FLATUS: 1
VOMITING: 0
HEMATOCHEZIA: 0
FEVER: 0
FREQUENCY: 0
DIARRHEA: 0
CONSTIPATION: 0
HEMATURIA: 0
ABDOMINAL PAIN: 1
DYSURIA: 0
BELCHING: 0
WEIGHT LOSS: 0
NAUSEA: 0
HEADACHES: 0

## 2024-03-15 ASSESSMENT — PATIENT HEALTH QUESTIONNAIRE - PHQ9
1. LITTLE INTEREST OR PLEASURE IN DOING THINGS: NOT AT ALL
2. FEELING DOWN, DEPRESSED OR HOPELESS: NOT AT ALL
SUM OF ALL RESPONSES TO PHQ9 QUESTIONS 1 AND 2: 0

## 2024-03-15 NOTE — PROGRESS NOTES
Subjective   Patient ID: Leah Gonzáles is a 33 y.o. female who presents for Abdominal Pain (Pt is here for abdominal pain and rash on thumb).  Abdominal Pain  This is a new problem. The current episode started in the past 7 days. The onset quality is sudden. The problem occurs rarely. The most recent episode lasted 1 hours. The problem has been resolved. The pain is located in the generalized abdominal region. The pain is at a severity of 6/10. The quality of the pain is aching, dull and a sensation of fullness. The abdominal pain does not radiate. Associated symptoms include flatus. Pertinent negatives include no anorexia, arthralgias, belching, constipation, diarrhea, dysuria, fever, frequency, headaches, hematochezia, hematuria, melena, myalgias, nausea, vomiting or weight loss. Nothing aggravates the pain.     Acute onset of abdominal distension, pain 3 days ago.  Nothing out of ordinary, not constipated but very gassy.  She has been on Mounjaro for some time, which she has done well with, and usually has not had problems.    Review of Systems   Constitutional:  Negative for fever and weight loss.   Gastrointestinal:  Positive for abdominal pain and flatus. Negative for anorexia, constipation, diarrhea, hematochezia, melena, nausea and vomiting.   Genitourinary:  Negative for dysuria, frequency, hematuria and menstrual problem.   Musculoskeletal:  Negative for arthralgias and myalgias.   Skin:  Positive for rash (continued dry, scaly skin to right hand).   Neurological:  Negative for headaches.       Objective   Vitals:    03/15/24 0804   BP: 92/65   Pulse: 75   SpO2: 98%   Weight: 72.2 kg (159 lb 3.2 oz)      Physical Exam  Constitutional:       Appearance: Normal appearance.   HENT:      Head: Normocephalic and atraumatic.   Eyes:      Extraocular Movements: Extraocular movements intact.      Pupils: Pupils are equal, round, and reactive to light.   Cardiovascular:      Rate and Rhythm: Normal rate and  regular rhythm.   Pulmonary:      Effort: Pulmonary effort is normal.      Breath sounds: Normal breath sounds.   Abdominal:      General: Bowel sounds are normal. There is no distension.      Palpations: Abdomen is soft.      Tenderness: There is no abdominal tenderness.   Musculoskeletal:      Cervical back: Normal range of motion and neck supple.   Skin:         Neurological:      General: No focal deficit present.      Mental Status: She is alert and oriented to person, place, and time.   Psychiatric:         Mood and Affect: Mood normal.         Behavior: Behavior normal.         Assessment/Plan   There are no diagnoses linked to this encounter.    Problem List Items Addressed This Visit             ICD-10-CM    Moderate persistent asthma without complication J45.40    Relevant Medications    albuterol 90 mcg/actuation inhaler    albuterol 2.5 mg /3 mL (0.083 %) nebulizer solution     Other Visit Diagnoses         Codes    Generalized abdominal pain    -  Primary R10.84    Exam completely normal and symptoms have resolved.  Symptoms could be secondary to Mounjaro, or to what she had eaten prior to symptoms.  Reassurance given.     Xerosis cutis     L85.3    Will trial topical Ammonium lactate     Relevant Medications    ammonium lactate (Amlactin) 12 % cream

## 2024-03-22 ENCOUNTER — APPOINTMENT (OUTPATIENT)
Dept: PHYSICAL THERAPY | Facility: CLINIC | Age: 34
End: 2024-03-22
Payer: MEDICAID

## 2024-03-26 ENCOUNTER — TELEPHONE (OUTPATIENT)
Dept: PRIMARY CARE | Facility: CLINIC | Age: 34
End: 2024-03-26
Payer: MEDICAID

## 2024-03-26 NOTE — LETTER
March 27, 2024     Patient: Leah Gonzáles   YOB: 1990   Date of Visit: 3/26/2024       To Whom It May Concern:    Leah Gonzáles is under my care.  She has a history of severe anaphylactic reaction to immunizations.  For this reason she is exempt from COVID-19 vaccine requirement.    If you have any questions or concerns, please don't hesitate to call.         Sincerely,         Nik Stanton MD        CC: No Recipients

## 2024-03-26 NOTE — TELEPHONE ENCOUNTER
Patient is inuring about a Covid exemption letter. Patient states that she has received one in the past from PCP.

## 2024-03-29 ENCOUNTER — APPOINTMENT (OUTPATIENT)
Dept: PHYSICAL THERAPY | Facility: CLINIC | Age: 34
End: 2024-03-29
Payer: MEDICAID

## 2024-04-01 ENCOUNTER — APPOINTMENT (OUTPATIENT)
Dept: PRIMARY CARE | Facility: CLINIC | Age: 34
End: 2024-04-01
Payer: MEDICAID

## 2024-04-05 ENCOUNTER — APPOINTMENT (OUTPATIENT)
Dept: PHYSICAL THERAPY | Facility: CLINIC | Age: 34
End: 2024-04-05
Payer: MEDICAID